# Patient Record
Sex: FEMALE | Race: WHITE
[De-identification: names, ages, dates, MRNs, and addresses within clinical notes are randomized per-mention and may not be internally consistent; named-entity substitution may affect disease eponyms.]

---

## 2022-09-15 ENCOUNTER — HOSPITAL ENCOUNTER (OUTPATIENT)
Dept: HOSPITAL 75 - CARD | Age: 62
End: 2022-09-15
Attending: INTERNAL MEDICINE
Payer: COMMERCIAL

## 2022-09-15 VITALS — DIASTOLIC BLOOD PRESSURE: 77 MMHG | SYSTOLIC BLOOD PRESSURE: 195 MMHG

## 2022-09-15 DIAGNOSIS — I35.0: Primary | ICD-10-CM

## 2022-09-15 DIAGNOSIS — I51.7: ICD-10-CM

## 2022-09-15 PROCEDURE — 93017 CV STRESS TEST TRACING ONLY: CPT

## 2022-09-15 PROCEDURE — 93306 TTE W/DOPPLER COMPLETE: CPT

## 2022-09-16 NOTE — CARDIOLOGY STRESS TEST REPORT
TREADMILL STRESS TEST


Date of procedure: 09/15/2022.


Primary care provider: Pallavi Cloud MD.


Admitting physician: Paul Serrano Jr., MD.





INDICATION: Chest pain.





BASELINE ELECTROCARDIOGRAM: 


Sinus rhythm, normal tracing.





STRESS TEST PROCEDURE:  The patient was exercised for a total of 1 minutes and 

42 seconds of the standard Ronald protocol achieving a maximum MET level of 3. 

The resting heart rate was 60 bpm and the peak heart rate was 117 bpm, which 

represents 74% of the maximum predicted heart rate.  The resting blood pressure 

was 150/105 mmHg and the peak blood pressure was 236/97 mmHg.  This represents a

suboptimal heart rate and a hypertensive blood pressure response to exercise 

with resting hypertension.  The test was stopped due to knee pain.  There was no

chest discomfort during the test.  There were no arrhythmias during the test.  

There was approximately 1 mm of horizontal ST depression in the inferior leads 

during stress that persisted into recovery. The patient exhibited poor exercise 

capacity for age.


 





IMPRESSION:


1.  Suboptimal heart rate and a hypertensive blood pressure response to exercise

with resting hypertension.


2.  There was no chest discomfort or arrhythmias during the test.


3.  There were borderline exercise-induced electrocardiogram changes.


4.  The patient exhibited poor exercise capacity for age at 1 minute and 42 

seconds of the Ronald protocol.


5.  This is an inconclusive study due to inadequate heart rate response although

at the suboptimal heart rate, the patient did start to develop some ST changes 

during the test.





Certain portions of this document may have been dictated utilizing voice 

recognition technology.  Inherent to this technology, typographical and 

grammatical errors may exist.  As much as I am diligent to identify and correct 

these mistakes, some errors may remain in the document.











PAUL SERRANO JR, MD         Sep 16, 2022 08:06